# Patient Record
Sex: FEMALE | Race: WHITE | Employment: OTHER | URBAN - METROPOLITAN AREA
[De-identification: names, ages, dates, MRNs, and addresses within clinical notes are randomized per-mention and may not be internally consistent; named-entity substitution may affect disease eponyms.]

---

## 2022-05-05 ENCOUNTER — CONSULTATION/EVALUATION (OUTPATIENT)
Dept: URBAN - METROPOLITAN AREA CLINIC 43 | Facility: CLINIC | Age: 47
End: 2022-05-05

## 2022-05-05 DIAGNOSIS — H04.123: ICD-10-CM

## 2022-05-05 DIAGNOSIS — H40.023: ICD-10-CM

## 2022-05-05 DIAGNOSIS — R51.9: ICD-10-CM

## 2022-05-05 DIAGNOSIS — H40.033: ICD-10-CM

## 2022-05-05 PROCEDURE — 76514 ECHO EXAM OF EYE THICKNESS: CPT

## 2022-05-05 PROCEDURE — 92132 CPTRZD OPH DX IMG ANT SGM: CPT

## 2022-05-05 PROCEDURE — 92020 GONIOSCOPY: CPT

## 2022-05-05 PROCEDURE — 99204 OFFICE O/P NEW MOD 45 MIN: CPT

## 2022-05-05 ASSESSMENT — VISUAL ACUITY
OS_SC: 20/30-1
OD_SC: 20/40
OS_SC: J2
OD_SC: J4

## 2022-05-05 ASSESSMENT — PACHYMETRY
OD_CT_UM: 556
OS_CT_UM: 542

## 2022-05-05 ASSESSMENT — TONOMETRY
OS_IOP_MMHG: 18
OD_IOP_MMHG: 18

## 2022-05-25 ENCOUNTER — PRE-OP/H&P (OUTPATIENT)
Dept: URBAN - METROPOLITAN AREA SURGERY 14 | Facility: SURGERY | Age: 47
End: 2022-05-25

## 2022-05-25 ENCOUNTER — SURGERY/PROCEDURE (OUTPATIENT)
Dept: URBAN - METROPOLITAN AREA SURGERY 14 | Facility: SURGERY | Age: 47
End: 2022-05-25

## 2022-05-25 DIAGNOSIS — H40.033: ICD-10-CM

## 2022-05-25 PROCEDURE — 99211T TECH SERVICE

## 2022-05-25 PROCEDURE — 6676150 BILATERAL LASER IRIDOTOMY

## 2022-06-23 ENCOUNTER — FOLLOW UP (OUTPATIENT)
Dept: URBAN - METROPOLITAN AREA CLINIC 43 | Facility: CLINIC | Age: 47
End: 2022-06-23

## 2022-06-23 DIAGNOSIS — H40.023: ICD-10-CM

## 2022-06-23 DIAGNOSIS — H40.013: ICD-10-CM

## 2022-06-23 DIAGNOSIS — H40.033: ICD-10-CM

## 2022-06-23 PROCEDURE — 92132 CPTRZD OPH DX IMG ANT SGM: CPT

## 2022-06-23 PROCEDURE — 92020 GONIOSCOPY: CPT

## 2022-06-23 PROCEDURE — 92012 INTRM OPH EXAM EST PATIENT: CPT

## 2022-06-23 PROCEDURE — 92250 FUNDUS PHOTOGRAPHY W/I&R: CPT

## 2022-06-23 ASSESSMENT — TONOMETRY
OD_IOP_MMHG: 16
OS_IOP_MMHG: 16

## 2022-06-23 ASSESSMENT — VISUAL ACUITY
OD_CC: 20/30
OS_CC: 20/25

## 2022-06-23 NOTE — PATIENT DISCUSSION
Angles open to 40 degrees. Pt advised to contact PCP regarding HA's.  [id number] Katia Bishop for pt to use antihistamines etc.